# Patient Record
Sex: FEMALE | Race: WHITE | URBAN - METROPOLITAN AREA
[De-identification: names, ages, dates, MRNs, and addresses within clinical notes are randomized per-mention and may not be internally consistent; named-entity substitution may affect disease eponyms.]

---

## 2018-04-17 ENCOUNTER — EMERGENCY (EMERGENCY)
Facility: HOSPITAL | Age: 18
LOS: 1 days | Discharge: HOME | End: 2018-04-17
Attending: EMERGENCY MEDICINE | Admitting: EMERGENCY MEDICINE
Payer: SELF-PAY

## 2018-04-17 VITALS
RESPIRATION RATE: 16 BRPM | DIASTOLIC BLOOD PRESSURE: 67 MMHG | SYSTOLIC BLOOD PRESSURE: 98 MMHG | WEIGHT: 101.41 LBS | HEART RATE: 57 BPM | TEMPERATURE: 98 F | OXYGEN SATURATION: 100 %
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
## 2018-04-17 DIAGNOSIS — S61.213A LACERATION WITHOUT FOREIGN BODY OF LEFT MIDDLE FINGER WITHOUT DAMAGE TO NAIL, INITIAL ENCOUNTER: ICD-10-CM
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
## 2018-04-17 PROCEDURE — 99283 EMERGENCY DEPT VISIT LOW MDM: CPT | Mod: 25

## 2018-04-17 PROCEDURE — 12001 RPR S/N/AX/GEN/TRNK 2.5CM/<: CPT

## 2018-04-17 RX ORDER — CEPHALEXIN 500 MG
1 CAPSULE ORAL
Qty: 20 | Refills: 0 | OUTPATIENT
Start: 2018-04-17 | End: 2018-04-21

## 2018-04-17 NOTE — ED ADULT TRIAGE NOTE - CHIEF COMPLAINT QUOTE
c/o left 3rd digit laceration after finger got stuck on spinning machine at 830AM. reports burning to area. last tetanus in 2009.

## 2018-04-17 NOTE — ED PROVIDER NOTE - CARE PLAN
Principal Discharge DX:	Finger laceration, initial encounter  Assessment and plan of treatment:	Patient instructed to return to the ER immediately for any redness, swelling, severe pain, purulent drainage, fever, or any other signs of infection or concerns.

## 2018-04-17 NOTE — ED PROVIDER NOTE - MEDICAL DECISION MAKING DETAILS
Laceration repair to the left 3rd digit s/p spinning class. Tetanus up to date. Laceration repair to the left 3rd digit s/p spinning class. Tetanus up to date.  Repaired - see procedure note.  Will place on Keflex for infection ppx.  Infection precautions discussed.  Will have stitches removed in 10 days.

## 2018-04-17 NOTE — ED ADULT NURSE NOTE - ADDITIONAL PRINTED INSTRUCTIONS GIVEN
D/c w/ instuctions for followup, patient to f/u w/ md, return to ER for worsening symptoms, verbalized understanding.

## 2018-04-17 NOTE — ED PROVIDER NOTE - OBJECTIVE STATEMENT
18 y/o Right Hand Dominate Female with no PMHx presents to the ED for a laceration to the left 3rd finger. Pt states she was in her spinning class and her finger was caught in the clip on the pedal. Sustained a laceration to the bach left 3rd finger. Tetanus up to date. Denies numbness and tingling in the injured finger. 18 y/o Right Hand Dominate Female with no PMHx presents to the ED for a laceration to the left 3rd finger. Pt states she was in her spinning class and her finger was caught in the clip on her pedal. Sustained a laceration to the bach left 3rd finger. Tetanus up to date. Denies numbness and tingling in the injured finger.  [facility] to the [facility] but left and came here after they "weren't sure what to do."

## 2018-04-17 NOTE — ED PROCEDURE NOTE - CPROC ED POST PROC CARE GUIDE1
Instructed patient/caregiver regarding signs and symptoms of infection./Keep the cast/splint/dressing clean and dry.

## 2018-04-17 NOTE — ED PROVIDER NOTE - PLAN OF CARE
Patient instructed to return to the ER immediately for any redness, swelling, severe pain, purulent drainage, fever, or any other signs of infection or concerns.

## 2018-04-17 NOTE — ED PROVIDER NOTE - PHYSICAL EXAMINATION
GEN: Well appearing, well nourished, awake, alert, oriented to person, place, time/situation and in no apparent distress.  ENT: Airway patent, Nasal mucosa clear. Mouth with normal mucosa.  EYES: Clear bilaterally.  RESPIRATORY: Breathing comfortably with normal RR.  MSK: Brisk refill, full ROM of left fingers and hand. Full Flexion and extension of the left 3rd digit.   NEURO: Alert and oriented, no focal deficits.  SKIN: 1cm V-shaped flap laceration to the volar aspect of the middle pharynx to the left 3rd finger.   PSYCH: Alert and oriented to person, place, time/situation. normal mood and affect. no apparent risk to self or others.

## 2024-02-07 NOTE — ED PROVIDER NOTE - NEURO NEGATIVE STATEMENT, MLM
Hypertension well-controlled at this time.  Continue lisinopril hydrochlorothiazide 20-25 mg daily, metoprolol tartrate 25 mg daily.   No Numbness and tingling.